# Patient Record
Sex: MALE | Race: WHITE
[De-identification: names, ages, dates, MRNs, and addresses within clinical notes are randomized per-mention and may not be internally consistent; named-entity substitution may affect disease eponyms.]

---

## 2017-04-19 ENCOUNTER — HOSPITAL ENCOUNTER (OUTPATIENT)
Dept: HOSPITAL 11 - JP.SDS | Age: 77
Discharge: HOME | End: 2017-04-19
Attending: SURGERY
Payer: MEDICARE

## 2017-04-19 VITALS — DIASTOLIC BLOOD PRESSURE: 78 MMHG | SYSTOLIC BLOOD PRESSURE: 116 MMHG

## 2017-04-19 DIAGNOSIS — E03.9: ICD-10-CM

## 2017-04-19 DIAGNOSIS — Z12.11: Primary | ICD-10-CM

## 2017-04-19 DIAGNOSIS — Z79.899: ICD-10-CM

## 2017-04-19 DIAGNOSIS — K29.00: ICD-10-CM

## 2017-04-19 DIAGNOSIS — K21.9: ICD-10-CM

## 2017-04-19 PROCEDURE — 43239 EGD BIOPSY SINGLE/MULTIPLE: CPT

## 2017-04-20 NOTE — OR
DATE OF PROCEDURE:  04/19/2017

 

PREOPERATIVE DIAGNOSES:  Gastroesophageal reflux disease and colon cancer 
screening.

 

POSTOPERATIVE DIAGNOSES:  Gastroesophageal reflux disease and unremarkable 
colonoscopy.

 

PROCEDURE:  Esophagogastroduodenoscopy with biopsy of gastroesophageal junction
, and

colonoscopy to the cecum.

 

ANESTHESIA:  IV anesthesia with monitored anesthesia care.

 

INDICATIONS:  This 77-year-old white male is referred for upper and lower 
endoscopy.

Indication for upper endoscopy is symptoms of gastroesophageal reflux disease.  
Indication

for the colonoscopy is colon cancer screening.  His last colonoscopic exam he 
says was ten

years ago.  I counseled him for upper and lower endoscopy with possible biopsy 
and/or

polypectomy including risks and alternatives, and he gave his informed consent 
to proceed.

 

DESCRIPTION OF PROCEDURE:  The patient was placed in the left lateral decubitus 
position.

IV anesthesia was administered by the Anesthesia Service.  Time-out was held.  
The flexible

video Olympus upper endoscope was passed through his mouth, down his esophagus 
and into 

his stomach.  The scope was easily passed through the pylorus into the duodenum 
reaching its

third portion.  The scope was then slowly withdrawn, examining the mucosa 
throughout.  The

duodenal mucosa appeared unremarkable.  The scope was brought back up through 
the pylorus.

The antrum appeared unremarkable.  The scope was retroflexed, the proximal 
stomach appeared

unremarkable.  The scope was straightened and brought up to the GE junction.  
This was

abnormal in that the Z-line was not straight with fingers of gastric mucosa 
going proximally up 

into the esophagus.  We obtained multiple totalling six biopsies of the 
gastroesophageal junction.

The scope was then brought proximally through the remainder ofthe esophagus, 
which otherwise 

appeared unremarkable.  Next a rectal exam was performed, which was 
unremarkable.  The 

flexible video Olympus colonoscope was introduced through his anus, up his 
rectum, and out 

his colon all way to the cecum.  Once the cecum was reached, the scope was 
slowly withdrawn, 

examining the mucosa throughout.  No mucosal abnormalities were noted.  The 
scope was 

retroflexed in the rectum with the distal rectum appearing unremarkable.  The 
scope was 

straightened and removed.  He tolerated the procedure well.

 

 

 

 

Ethan Burkett MD

DD:  04/19/2017 11:39:07

DT:  04/19/2017 14:24:25

Job #:  [id number]

MTDD

## 2017-09-28 ENCOUNTER — HOSPITAL ENCOUNTER (OUTPATIENT)
Dept: HOSPITAL 11 - JP.SDS | Age: 77
Discharge: HOME | End: 2017-09-28
Attending: OPHTHALMOLOGY
Payer: MEDICARE

## 2017-09-28 VITALS — DIASTOLIC BLOOD PRESSURE: 81 MMHG | SYSTOLIC BLOOD PRESSURE: 138 MMHG

## 2017-09-28 DIAGNOSIS — Z88.8: ICD-10-CM

## 2017-09-28 DIAGNOSIS — K21.9: ICD-10-CM

## 2017-09-28 DIAGNOSIS — H25.11: Primary | ICD-10-CM

## 2017-09-28 DIAGNOSIS — Z98.890: ICD-10-CM

## 2017-09-28 PROCEDURE — 66984 XCAPSL CTRC RMVL W/O ECP: CPT

## 2017-09-28 PROCEDURE — C1780 LENS, INTRAOCULAR (NEW TECH): HCPCS

## 2017-09-28 NOTE — OR
DATE OF PROCEDURE:  09/28/2017

 

POSTOPERATIVE CARE:  Postoperative care will be provided mainly at the [address] [place] [address] Eye

[facility] in conjunction with [facility].

 

PREOPERATIVE DIAGNOSIS:  Cataract, right eye.

 

PREOPERATIVE DIAGNOSIS:  Cataract, right eye.

 

PROCEDURE:  Cataract extraction, phacoemulsification with intraocular lens placement, right

eye.

 

ANESTHESIA:  Topical and intracameral.

 

ESTIMATED BLOOD LOSS:  Minimal.

 

COMPLICATIONS:  None.

 

PATHOLOGY SPECIMENS:  None.

 

SURGICAL FINDINGS:  None.

 

INDICATION FOR PROCEDURE:  The patient is a 77-year-old male with history of a visually

significant cataract in the right eye, which interfered with activities of daily living.

This consisted of a nuclear sclerosis cataract.  Following careful discussion of the risks,

benefits and alternatives to cataract extraction with intraocular lens placement including

blindness and death, the patient elected to proceed, and informed, written consent was

obtained prior to the procedure.

 

DESCRIPTION OF THE PROCEDURE:  The patient was previously identified, and a coco placed

above the right eye.  All sources, including the patient, indicated that the right eye was

the correct eye.  The patient was subsequently taken to the operating room where standard

monitors were applied.  The patient was then prepped and draped in the usual sterile fashion

for ophthalmic surgery.  Attention was first directed at the 12 o'clock position where a

paracentesis port was fashioned.  Shugar solution followed by Viscoat was instilled into the

eye.  Attention was then directed to the 8:30 position where a triplanar incision was made

in a near-clear manner using a keratome.  A continuous capsulorrhexis was then made using a

combination of the cystotome and Utrata forceps.  Hydrodissection was achieved using a

balanced salt solution, and the lens rotated nicely.  Phacoemulsification was then done

using a modified divide-and-conquer technique without complication.  Phaco time was 10.25

CDE. The remaining cortex was removed using the irrigation/aspiration handpiece.  Provisc

was then instilled into the eye.  A Technis lens, model [id number], at 22.0 diopters was then

placed in the capsular bag using an Emerald injector.  The remaining viscoelastic was

removed using the irrigation/aspiration forceps.  All wounds were then checked and found to

be watertight.  The lid speculum and drapes were removed.

Maxitrol ointment was placed in the patient's right eye, and the eye was shielded.  The

patient tolerated the procedure well.  The patient was instructed to follow up tomorrow.

 

All needle and sponge counts were correct at the end of the procedure.

 

 

 

 

Maricel Kwong MD

DD:  09/28/2017 09:15:31

DT:  09/28/2017 11:07:36

Job #:  [id number]

## 2017-10-12 ENCOUNTER — HOSPITAL ENCOUNTER (OUTPATIENT)
Dept: HOSPITAL 11 - JP.SDS | Age: 77
Discharge: HOME | End: 2017-10-12
Attending: OPHTHALMOLOGY
Payer: MEDICARE

## 2017-10-12 VITALS — SYSTOLIC BLOOD PRESSURE: 128 MMHG | DIASTOLIC BLOOD PRESSURE: 72 MMHG

## 2017-10-12 DIAGNOSIS — H25.12: Primary | ICD-10-CM

## 2017-10-12 PROCEDURE — C1780 LENS, INTRAOCULAR (NEW TECH): HCPCS

## 2017-10-12 PROCEDURE — 66984 XCAPSL CTRC RMVL W/O ECP: CPT

## 2017-10-12 NOTE — OR
DATE OF PROCEDURE:  10/12/2017

 

POSTOPERATIVE CARE:  Postoperative care will be provided mainly at the [address] [place] [address] Eye

[facility] in conjunction with [facility].

 

PREOPERATIVE DIAGNOSIS:  Cataract, left eye.

 

PREOPERATIVE DIAGNOSIS:  Cataract, left eye.

 

PROCEDURE:  Cataract extraction, phacoemulsification with intraocular lens placement in the

left eye.

 

ANESTHESIA:  Topical and intracameral.

 

ESTIMATED BLOOD LOSS:  Minimal.

 

COMPLICATIONS:  None.

 

PATHOLOGY SPECIMENS:  None.

 

SURGICAL FINDINGS:  None.

 

INDICATION FOR PROCEDURE:  The patient is a 77-year-old male with history of a visually

significant cataract in the left eye, which interfered with activities of daily living.

This consisted of a nuclear sclerosis cataract.  Following careful discussion of the risks,

benefits and alternatives to cataract extraction with intraocular lens placement including

blindness and death, the patient elected to proceed, and informed, written consent was

obtained prior to the procedure.

 

DESCRIPTION OF THE PROCEDURE:  The patient was previously identified, and a coco placed

above the left eye.  All sources, including the patient, indicated that the left eye was the

correct eye.  The patient was subsequently taken to the operating room where standard

monitors were applied.  The patient was then prepped and draped in the usual sterile fashion

for ophthalmic surgery.  Attention was first directed at the 12 o'clock position where a

paracentesis port was fashioned.  Shugar solution followed by Viscoat was instilled into the

eye.  Attention was then directed to the 8:30 position where a triplanar incision was made

in a near-clear manner using a keratome.  A continuous capsulorrhexis was then made using a

combination of the cystotome and Utrata forceps.  Hydrodissection was achieved using a

balanced salt solution, and the lens rotated nicely.  Phacoemulsification was then done

using a modified divide-and-conquer technique without complication.  Phaco time was 8.27

CDE. The remaining cortex was removed using the irrigation/aspiration handpiece.  Provisc

was then instilled into the eye.  A Technis lens, model [id number], at 21.0 diopters was then

placed in the capsular bag using an Emerald injector.  The remaining viscoelastic was

removed using the irrigation/aspiration forceps.  All wounds were then checked and found to

be watertight.  The lid speculum and drapes were removed.

Maxitrol ointment was placed in the patient's left eye, and the eye was shielded.  The

patient tolerated the procedure well.  The patient was instructed to follow up tomorrow.

 

All needle and sponge counts were correct at the end of the procedure.

 

 

 

 

Maricel Kwong MD

DD:  10/12/2017 09:30:12

DT:  10/12/2017 11:43:06

Job #:  [id number]

## 2019-08-16 ENCOUNTER — HOSPITAL ENCOUNTER (EMERGENCY)
Dept: HOSPITAL 11 - JP.ED | Age: 79
Discharge: HOME | End: 2019-08-16
Payer: MEDICARE

## 2019-08-16 VITALS — SYSTOLIC BLOOD PRESSURE: 120 MMHG | HEART RATE: 58 BPM | DIASTOLIC BLOOD PRESSURE: 59 MMHG

## 2019-08-16 DIAGNOSIS — E03.9: ICD-10-CM

## 2019-08-16 DIAGNOSIS — Z88.1: ICD-10-CM

## 2019-08-16 DIAGNOSIS — M79.605: ICD-10-CM

## 2019-08-16 DIAGNOSIS — Z88.8: ICD-10-CM

## 2019-08-16 DIAGNOSIS — J06.9: Primary | ICD-10-CM

## 2019-08-16 DIAGNOSIS — E78.00: ICD-10-CM

## 2019-08-16 DIAGNOSIS — Z79.899: ICD-10-CM

## 2019-08-16 NOTE — EDM.PDOC
ED HPI GENERAL MEDICAL PROBLEM





- General


Chief Complaint: General


Stated Complaint: COUGH HEADACHE   AND SORE ON LEFT LEG


Time Seen by Provider: 08/16/19 11:45


Source of Information: Reports: Patient


History Limitations: Reports: No Limitations





- History of Present Illness


INITIAL COMMENTS - FREE TEXT/NARRATIVE: 





He complains of sorethroat, headache, and right ear pain for 5 days.  He has a 

history of chronic ear pain.  No fever, chills or sweating.  No 

immunocompromised problems.  He is not a smoker.  He had a right pneumanectomy 

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  He also has right leg pain.  He says it is a 

cramp at rest.  No exertion related pain.  No history of claudication or 

vascular problems.  Not on diuretics.


  ** Headache


Pain Score (Numeric/FACES): 1





  ** Throat


Pain Score (Numeric/FACES): 2





- Related Data


 Allergies











Allergy/AdvReac Type Severity Reaction Status Date / Time


 


niacin Allergy  Itching Verified 08/16/19 12:20


 


simvastatin Allergy  Itching Verified 08/16/19 12:20











Home Meds: 


 Home Meds





Cyanocobalamin (Vitamin B-12) [Vitamin B-12] 2,500 mcg SL .MWF 04/17/17 [History

]


Levothyroxine Sodium [Levoxyl] 100 mcg PO DAILY 04/17/17 [History]


Omeprazole 20 mg PO DAILY 04/17/17 [History]











Past Medical History


HEENT History: Reports: Cataract, Hard of Hearing, Impaired Vision


Other HEENT History: wears glasses


Cardiovascular History: Reports: High Cholesterol


Respiratory History: Reports: Other (See Below)


Other Respiratory History: right side lung damage from accident,


Gastrointestinal History: Reports: Chronic Constipation, Hemorrhoids


Genitourinary History: Reports: Prostate Disorder


Musculoskeletal History: Reports: Arthritis, Osteoarthritis


Neurological History: Reports: Concussion, Vertigo, Other (See Below)


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


Psychiatric History: Reports: None


Endocrine/Metabolic History: Reports: Hypothyroidism


Hematologic History: Reports: Blood Transfusion(s)


Immunologic History: Reports: None


Oncologic (Cancer) History: Reports: None


Dermatologic History: Reports: None





- Infectious Disease History


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





- Past Surgical History


Head Surgeries/Procedures: Reports: None


HEENT Surgical History: Reports: Cataract Surgery, Oral Surgery


Cardiovascular Surgical History: Reports: None


Respiratory Surgical History: Reports: None


GI Surgical History: Reports: Colonoscopy


Male  Surgical History: Reports: None


Endocrine Surgical History: Reports: None


Neurological Surgical History: Reports: Spinal Fusion


Musculoskeletal Surgical History: Reports: None


Oncologic Surgical History: Reports: None


Dermatological Surgical History: Reports: None





Social & Family History





- Family History


Family Medical History: Noncontributory





- Caffeine Use


Caffeine Use: Reports: Coffee





ED ROS GENERAL





- Review of Systems


Review Of Systems: See Below


Constitutional: Reports: No Symptoms


HEENT: Reports: Ear Pain, Throat Pain.  Denies: Eye Discharge, Rhinitis, Sinus 

Problem


Respiratory: Reports: Cough.  Denies: Shortness of Breath


Cardiovascular: Reports: No Symptoms


GI/Abdominal: Reports: No Symptoms


: Reports: No Symptoms


Musculoskeletal: Reports: Leg Pain.  Denies: Back Pain


Neurological: Reports: Headache





ED EXAM, GENERAL





- Physical Exam


Exam: See Below


Exam Limited By: No Limitations


General Appearance: Alert, WD/WN, No Apparent Distress


Ears: Other (he has a right TM perforation)


Ear Exam: Right Ear: TM Perforation


Nose: Normal Inspection


Throat/Mouth: Normal Inspection


Neck: No: Lymphadenopathy (R), Lymphadenopathy (L)


Respiratory/Chest: No Respiratory Distress, Lungs Clear (diminished right sided 

BS (history of pneumonectomy)


Cardiovascular: Normal Peripheral Pulses, No Edema, No Gallop, No Murmur


Peripheral Pulses: 2+: Dorsalis Pedis (L), Dorsalis Pedis (R)


Extremities: Normal Inspection (There is a small lipoma right medial thigh)


Neurological: Alert, Oriented, No Motor/Sensory Deficits


Psychiatric: Normal Affect


Skin Exam: Warm, Dry





Course





- Vital Signs


Last Recorded V/S: 


 Last Vital Signs











Temp  35.4 C   08/16/19 12:17


 


Pulse  58 L  08/16/19 12:17


 


Resp  16   08/16/19 12:17


 


BP  120/59 L  08/16/19 12:17


 


Pulse Ox  97   08/16/19 12:17














- Orders/Labs/Meds


Labs: 


 Laboratory Tests











  08/16/19 Range/Units





  13:04 


 


Sodium  139 L  (140-148)  mmol/L


 


Potassium  4.3  (3.6-5.2)  mmol/L


 


Chloride  104  (100-108)  mmol/L


 


Carbon Dioxide  31  (21-32)  mmol/L


 


Anion Gap  8.3  (5.0-14.0)  mmol/L














- Re-Assessments/Exams


Free Text/Narrative Re-Assessment/Exam: 





08/16/19 12:56


He was examined and ordered lab.  


Free Text/Narrative Re-Assessment/Exam: 





08/16/19 13:51


His labs look good. 


He is being discharged home to follow up with his doctor and ENT.





Departure





- Departure


Time of Disposition: 13:50


Disposition: Home, Self-Care 01


Condition: Good


Clinical Impression: 


 URI (upper respiratory infection), Leg pain, left








- Discharge Information


*PRESCRIPTION DRUG MONITORING PROGRAM REVIEWED*: No


Instructions:  Viral Respiratory Infection


Referrals: 


Margot Tam MD [Primary Care Provider] - 


Forms:  ED Department Discharge


Care Plan Goals: 


Take Amoxicillin that you have at home if you aren't better in 2 days.


See a doctor if you are worsening.


See an ENT doctor for your right ear.